# Patient Record
Sex: MALE | Race: WHITE | NOT HISPANIC OR LATINO | URBAN - METROPOLITAN AREA
[De-identification: names, ages, dates, MRNs, and addresses within clinical notes are randomized per-mention and may not be internally consistent; named-entity substitution may affect disease eponyms.]

---

## 2019-12-17 ENCOUNTER — EMERGENCY (EMERGENCY)
Facility: HOSPITAL | Age: 59
LOS: 1 days | Discharge: HOME | End: 2019-12-17
Attending: EMERGENCY MEDICINE
Payer: COMMERCIAL

## 2019-12-17 VITALS
WEIGHT: 179.9 LBS | DIASTOLIC BLOOD PRESSURE: 82 MMHG | TEMPERATURE: 98 F | OXYGEN SATURATION: 96 % | HEART RATE: 61 BPM | HEIGHT: 64 IN | SYSTOLIC BLOOD PRESSURE: 137 MMHG | RESPIRATION RATE: 18 BRPM

## 2019-12-17 PROCEDURE — 99284 EMERGENCY DEPT VISIT MOD MDM: CPT

## 2019-12-17 NOTE — ED ADULT TRIAGE NOTE - CHIEF COMPLAINT QUOTE
cat bite on Saturday. worsening swelling and redness since. no DM history. reports chills. on Amoxcillin. tetanus UTD.

## 2019-12-18 VITALS
SYSTOLIC BLOOD PRESSURE: 128 MMHG | DIASTOLIC BLOOD PRESSURE: 71 MMHG | RESPIRATION RATE: 18 BRPM | TEMPERATURE: 99 F | HEART RATE: 62 BPM | OXYGEN SATURATION: 97 %

## 2019-12-18 LAB
ALBUMIN SERPL ELPH-MCNC: 4.1 G/DL — SIGNIFICANT CHANGE UP (ref 3.3–5)
ALP SERPL-CCNC: 72 U/L — SIGNIFICANT CHANGE UP (ref 40–120)
ALT FLD-CCNC: 17 U/L — SIGNIFICANT CHANGE UP (ref 10–45)
ANION GAP SERPL CALC-SCNC: 13 MMOL/L — SIGNIFICANT CHANGE UP (ref 5–17)
AST SERPL-CCNC: 16 U/L — SIGNIFICANT CHANGE UP (ref 10–40)
BASE EXCESS BLDV CALC-SCNC: 0 MMOL/L — SIGNIFICANT CHANGE UP (ref -2–2)
BILIRUB SERPL-MCNC: 0.2 MG/DL — SIGNIFICANT CHANGE UP (ref 0.2–1.2)
BUN SERPL-MCNC: 17 MG/DL — SIGNIFICANT CHANGE UP (ref 7–23)
CA-I SERPL-SCNC: 1.21 MMOL/L — SIGNIFICANT CHANGE UP (ref 1.12–1.3)
CALCIUM SERPL-MCNC: 9.2 MG/DL — SIGNIFICANT CHANGE UP (ref 8.4–10.5)
CHLORIDE BLDV-SCNC: 107 MMOL/L — SIGNIFICANT CHANGE UP (ref 96–108)
CHLORIDE SERPL-SCNC: 102 MMOL/L — SIGNIFICANT CHANGE UP (ref 96–108)
CO2 BLDV-SCNC: 26 MMOL/L — SIGNIFICANT CHANGE UP (ref 22–30)
CO2 SERPL-SCNC: 21 MMOL/L — LOW (ref 22–31)
CREAT SERPL-MCNC: 0.77 MG/DL — SIGNIFICANT CHANGE UP (ref 0.5–1.3)
GAS PNL BLDV: 138 MMOL/L — SIGNIFICANT CHANGE UP (ref 135–145)
GAS PNL BLDV: SIGNIFICANT CHANGE UP
GAS PNL BLDV: SIGNIFICANT CHANGE UP
GLUCOSE BLDV-MCNC: 155 MG/DL — HIGH (ref 70–99)
GLUCOSE SERPL-MCNC: 155 MG/DL — HIGH (ref 70–99)
HCO3 BLDV-SCNC: 24 MMOL/L — SIGNIFICANT CHANGE UP (ref 21–29)
HCT VFR BLD CALC: 41 % — SIGNIFICANT CHANGE UP (ref 39–50)
HCT VFR BLDA CALC: 43 % — SIGNIFICANT CHANGE UP (ref 39–50)
HGB BLD CALC-MCNC: 14 G/DL — SIGNIFICANT CHANGE UP (ref 13–17)
HGB BLD-MCNC: 13.7 G/DL — SIGNIFICANT CHANGE UP (ref 13–17)
LACTATE BLDV-MCNC: 1 MMOL/L — SIGNIFICANT CHANGE UP (ref 0.7–2)
MCHC RBC-ENTMCNC: 30.4 PG — SIGNIFICANT CHANGE UP (ref 27–34)
MCHC RBC-ENTMCNC: 33.4 GM/DL — SIGNIFICANT CHANGE UP (ref 32–36)
MCV RBC AUTO: 90.9 FL — SIGNIFICANT CHANGE UP (ref 80–100)
NRBC # BLD: 0 /100 WBCS — SIGNIFICANT CHANGE UP (ref 0–0)
OTHER CELLS CSF MANUAL: 18 ML/DL — SIGNIFICANT CHANGE UP (ref 18–22)
PCO2 BLDV: 41 MMHG — SIGNIFICANT CHANGE UP (ref 35–50)
PH BLDV: 7.4 — SIGNIFICANT CHANGE UP (ref 7.35–7.45)
PLATELET # BLD AUTO: 249 K/UL — SIGNIFICANT CHANGE UP (ref 150–400)
PO2 BLDV: 61 MMHG — HIGH (ref 25–45)
POTASSIUM BLDV-SCNC: 3.6 MMOL/L — SIGNIFICANT CHANGE UP (ref 3.5–5.3)
POTASSIUM SERPL-MCNC: 3.7 MMOL/L — SIGNIFICANT CHANGE UP (ref 3.5–5.3)
POTASSIUM SERPL-SCNC: 3.7 MMOL/L — SIGNIFICANT CHANGE UP (ref 3.5–5.3)
PROT SERPL-MCNC: 7.6 G/DL — SIGNIFICANT CHANGE UP (ref 6–8.3)
RBC # BLD: 4.51 M/UL — SIGNIFICANT CHANGE UP (ref 4.2–5.8)
RBC # FLD: 12.5 % — SIGNIFICANT CHANGE UP (ref 10.3–14.5)
SAO2 % BLDV: 91 % — HIGH (ref 67–88)
SODIUM SERPL-SCNC: 136 MMOL/L — SIGNIFICANT CHANGE UP (ref 135–145)
WBC # BLD: 8.55 K/UL — SIGNIFICANT CHANGE UP (ref 3.8–10.5)
WBC # FLD AUTO: 8.55 K/UL — SIGNIFICANT CHANGE UP (ref 3.8–10.5)

## 2019-12-18 PROCEDURE — 80053 COMPREHEN METABOLIC PANEL: CPT

## 2019-12-18 PROCEDURE — 96365 THER/PROPH/DIAG IV INF INIT: CPT

## 2019-12-18 PROCEDURE — 96375 TX/PRO/DX INJ NEW DRUG ADDON: CPT

## 2019-12-18 PROCEDURE — 99284 EMERGENCY DEPT VISIT MOD MDM: CPT | Mod: 25

## 2019-12-18 PROCEDURE — 96376 TX/PRO/DX INJ SAME DRUG ADON: CPT

## 2019-12-18 PROCEDURE — 82330 ASSAY OF CALCIUM: CPT

## 2019-12-18 PROCEDURE — G0378: CPT

## 2019-12-18 PROCEDURE — 99234 HOSP IP/OBS SM DT SF/LOW 45: CPT

## 2019-12-18 PROCEDURE — 87040 BLOOD CULTURE FOR BACTERIA: CPT

## 2019-12-18 PROCEDURE — 84295 ASSAY OF SERUM SODIUM: CPT

## 2019-12-18 PROCEDURE — 82803 BLOOD GASES ANY COMBINATION: CPT

## 2019-12-18 PROCEDURE — 82435 ASSAY OF BLOOD CHLORIDE: CPT

## 2019-12-18 PROCEDURE — 82947 ASSAY GLUCOSE BLOOD QUANT: CPT

## 2019-12-18 PROCEDURE — 85014 HEMATOCRIT: CPT

## 2019-12-18 PROCEDURE — 85027 COMPLETE CBC AUTOMATED: CPT

## 2019-12-18 PROCEDURE — 84132 ASSAY OF SERUM POTASSIUM: CPT

## 2019-12-18 PROCEDURE — 83605 ASSAY OF LACTIC ACID: CPT

## 2019-12-18 RX ORDER — KETOROLAC TROMETHAMINE 30 MG/ML
15 SYRINGE (ML) INJECTION ONCE
Refills: 0 | Status: DISCONTINUED | OUTPATIENT
Start: 2019-12-18 | End: 2019-12-18

## 2019-12-18 RX ORDER — CEFTRIAXONE 500 MG/1
1000 INJECTION, POWDER, FOR SOLUTION INTRAMUSCULAR; INTRAVENOUS ONCE
Refills: 0 | Status: COMPLETED | OUTPATIENT
Start: 2019-12-18 | End: 2019-12-18

## 2019-12-18 RX ORDER — IBUPROFEN 200 MG
600 TABLET ORAL ONCE
Refills: 0 | Status: COMPLETED | OUTPATIENT
Start: 2019-12-18 | End: 2019-12-18

## 2019-12-18 RX ADMIN — Medication 100 MILLIGRAM(S): at 03:11

## 2019-12-18 RX ADMIN — Medication 600 MILLIGRAM(S): at 18:55

## 2019-12-18 RX ADMIN — Medication 100 MILLIGRAM(S): at 18:19

## 2019-12-18 RX ADMIN — Medication 100 MILLIGRAM(S): at 11:08

## 2019-12-18 RX ADMIN — Medication 15 MILLIGRAM(S): at 04:39

## 2019-12-18 RX ADMIN — CEFTRIAXONE 1000 MILLIGRAM(S): 500 INJECTION, POWDER, FOR SOLUTION INTRAMUSCULAR; INTRAVENOUS at 03:11

## 2019-12-18 RX ADMIN — CEFTRIAXONE 100 MILLIGRAM(S): 500 INJECTION, POWDER, FOR SOLUTION INTRAMUSCULAR; INTRAVENOUS at 02:21

## 2019-12-18 NOTE — ED PROVIDER NOTE - PHYSICAL EXAMINATION
GEN: Well Appearing, Nontoxic, NAD  HEENT: NC/AT, MMM  Neck: supple  CV: reg rate  RESP: normal WOB, no distress  ABD: non-distended  EXT/MSK:  diffuse edema of dorsum of hand and L index finger, diffusely tender, limited ROM of MCP, PIP due to combination of pain/swelling  SKIN: significant erythema over dorsum of R hand extending from thumb towards ulnar aspect involving about 3/4 of the dorsum, tiny puncture wound on radial aspect of R index, not over flexor tendon, no focal tenderness over flexor tendon  Neuro: sensory and motor intact

## 2019-12-18 NOTE — ED ADULT NURSE REASSESSMENT NOTE - NS ED NURSE REASSESS COMMENT FT1
Pt received from DARREN Rocha. Pt oriented to CDU & plan of care was discussed. Pt endorses 5/10 pain to R hand. To be medicated as per MAR. Significant swelling noted to R hand. Pt able to close & open fist except for R index finger. Some redness noted. Pt encouraged to elevate hand across chest. Safety & comfort measures maintained. Call bell in reach. Will continue to monitor.
07.00 Am Received Report from DARREN Jacques pt is observed for Right hand cat bite for IV antibiotics Pt is due for IV clindamycin 600 mg @ 11am . Pt is A&OX4 has stable vitals Shantal N/V/D fever chills cp sob  IV site ,looks clean &dry  no signs of infiltration noted  Pt has + swelling in Right hand + bite stacey on 2nd finger Limited ROM  Redness swelling not spreading beyond the marker point safety & comfort measures continued  pt denies pain at this time continue to monitor

## 2019-12-18 NOTE — ED PROVIDER NOTE - CLINICAL SUMMARY MEDICAL DECISION MAKING FREE TEXT BOX
Pt presents with erythema and swelling of R hand consistent with cellulitis from cat bite. pt's sx have worsened despite being on Augmentin. given this worsening, pt would benefit from IV abx and observation stay to ensure it improves. pt is neurovasc intact. consideration for tenosynovitis but at this time seems less likely given no focal tendon sheath tenderness, wound not over tendon, but may benefit from hand eval while in CDU if sx do not improve.

## 2019-12-18 NOTE — ED CDU PROVIDER INITIAL DAY NOTE - MEDICAL DECISION MAKING DETAILS
Cat bite with overlying cellulitis not responding to Augmentin. placed in CDU for IV abx and observation to ensure it improves. expect ok to d/c once cellulitis starts receding as long as pt continues to feel and look well.

## 2019-12-18 NOTE — ED CDU PROVIDER INITIAL DAY NOTE - PHYSICAL EXAMINATION
GENERAL: Pt in NAD, A&O x3.   PSYCH: Affect appropriate.  RESPIRATORY: CTA anterior and posterior b/l, no wheezes, rales, or rhonchi.   CARDIAC: RRR, clear distinct S1, S2, no murmurs, gallops, or rubs.   EXTREMITIES: R hand edematous, crusted bite count radial aspect of R 1st digit with surrounding erythema spreading proximally to the region of the 1st MCP, warm, tenderness of dorsum of R hand and throughout R 1st digit. No active bleeding, no DC, no visible FB.  No streaking. No crepitus. No induration or fluctuance. Color appropriate for race, warm, 2+ radial and ulnar pulses b/l, cap refill < 2 seconds. Limited ROM of the R 1st digit with pain. Normal and equal sensation b/l UE, 5/5 strength b/l UE.

## 2019-12-18 NOTE — ED ADULT NURSE NOTE - OBJECTIVE STATEMENT
58 yo male from home sent by PMD for cat bite to right hand. Pt was bit by his own cat, has redness and swelling to right hand. on amox from PMD, tetanus up to date. Pt denies all other complaints. VS stable.

## 2019-12-18 NOTE — ED CDU PROVIDER INITIAL DAY NOTE - DETAILS
- pan control  - abx  - frequent eval  d/w Dr. Linda - pain control  - abx  - frequent eval  d/w Dr. Linda

## 2019-12-18 NOTE — ED ADULT NURSE REASSESSMENT NOTE - MUSCULOSKELETAL WDL
Full range of motion of upper and lower extremities, Right arm  joint tenderness/swelling.
Full range of motion of upper and lower extremities, no joint tenderness/swelling.

## 2019-12-18 NOTE — ED PROVIDER NOTE - OBJECTIVE STATEMENT
cat bite saturday, erythema and pain yest, went to urgetn care, got augmentin - 3 doses, worsening swelling erythema, limited ROm R hand. chills, malaise, fatigue. no n/v. Pt suffered cat bite to his right index finger on saturday. yesterday he developed erythema and pain at the site of the injury so he went to urgent care, got augmentin for it. He has taken 3 doses (last night, this am, tonight) but despite this he noted worsening swelling and erythema and it has gotten more painful. He has limited range of motion now due to pain and swelling. He endorses chills, malaise, fatigue. no n/v.

## 2019-12-18 NOTE — ED ADULT NURSE REASSESSMENT NOTE - NSIMPLEMENTINTERV_GEN_ALL_ED
Implemented All Universal Safety Interventions:  [place] to call system. Call bell, personal items and telephone within reach. Instruct patient to call for assistance. Room bathroom lighting operational. Non-slip footwear when patient is off stretcher. Physically safe environment: no spills, clutter or unnecessary equipment. Stretcher in lowest position, wheels locked, appropriate side rails in place.

## 2019-12-18 NOTE — ED CDU PROVIDER INITIAL DAY NOTE - PROGRESS NOTE DETAILS
Patient seen and evaluated at bedside. NAD. Reports R hand pain/swelling have slightly improved. No fever/chills. VSS. On exam, pt with healing puncture wound on R second digit without any drainage, slightly decreased flexion of second digit 2/2 swelling and NO pain with extension. + diffuse edema/erythema to dorsum of hand radial aspect>ulna. Sensation intact. Radial pulse 2+. Borders marked this morning. R hand elevated. Will continue to monitor. Pt reports persistent improvement in R hand pain/swelling. No fever/chills. VSS. Erythema within marked borders. Attending note. Patient has a cellulitis of the right index finger secondary to cat bites. Patient has been receiving IV dose of clindamycin with improvement. He reports decreased pain and decreased redness. He denies any fevers. He has greater range of motion of his finger. Physical examination-there is slight erythema and swelling of the right index finger and dorsum of the hand. There is no ascending lymphangitis. There is no tenderness on the flexor surface of the hand or digits. Patient will be discharged with clindamycin. Patient is stable for discharge. Patient will followup with hand as already scheduled. Patient seen and evaluated at bedside. NAD. Reports R hand pain/swelling have slightly improved. No fever/chills. VSS. On exam, pt with healing puncture wound on R second digit without any drainage, slightly decreased flexion of second digit 2/2 swelling and NO pain with extension. NO ttp on flexor surface of R second digit. + diffuse edema/erythema to dorsum of hand radial aspect>ulna. Sensation intact. Radial pulse 2+. Borders marked this morning. R hand elevated. Will continue to monitor. Pt comfortable, NAD. Reports R hand pain/swelling continuing to improve. No fever/chills. VSS. Erythema within marked borders, no streaking. Pt re-eval by myself and Dr. Garcia. Pt has hand appointment scheduled. Prefers to stay for third IV clinda dose, then plan for d/c home on PO abx.

## 2019-12-18 NOTE — ED CDU PROVIDER DISPOSITION NOTE - PATIENT PORTAL LINK FT
You can access the FollowMyHealth Patient Portal offered by [facility] by registering at the following website: [email or web address]. By joining [employer]’s FollowMyHealth portal, you will also be able to view your health information using other applications (apps) compatible with our system.

## 2019-12-18 NOTE — ED CDU PROVIDER DISPOSITION NOTE - NSFOLLOWUPINSTRUCTIONS_ED_ALL_ED_FT
1. Follow up with your PMD within 48-72hours (2-3 days).     2. Take Clindamycin as prescrebid. Finish the full course of your antibiotic, do not skip doses.    3. Rest and elevate affected area. Take Motrin 600mg every 8 hours with food for pain.     4. Return to ED if you experience any worsening redness, swelling, streaking (red lines), fever, chills, or any other concerning symptoms. 1. Follow up with your PMD within 48-72hours (2-3 days).     2. Take your antibiotics as prescribed. Finish the full course of your antibiotic, do not skip doses.    3. Rest and elevate affected area. Take Motrin 600mg every 8 hours with food for pain.     4. Return to ED if you experience any worsening redness, swelling, streaking (red lines), fever, chills, or any other concerning symptoms. 1. Follow up with your PMD within 48-72hours.    2. Take your antibiotics as prescribed:      Clindamycin 300mg four times daily for 10 days.       Finish the full course of your antibiotic, do not skip doses.    3. Rest and elevate affected area. Take Motrin 600mg every 8 hours with food for pain.     4. Return to ED if you experience any worsening redness, swelling, streaking (red lines), fever, chills, pain with hand/finger range of motion, or any other concerning symptoms. 1. Follow up with your PMD within 48-72hours.     Follow up with your appointment with Hand Surgeon.     2. Take your antibiotics as prescribed:      Clindamycin 300mg four times daily for 7 days.       Finish the full course of your antibiotic, do not skip doses.    3. Rest and elevate affected area. Take Motrin 600mg every 8 hours with food for pain.     4. Return to ED if you experience any worsening redness, swelling, streaking (red lines), fever, chills, pain with hand/finger range of motion, or any other concerning symptoms. 1. Follow up with your PCP within 48-72 hours.     Follow up with your appointment with Hand Surgeon.     2. Take your antibiotics as prescribed:      Clindamycin 300mg four times daily for 7 days.     Finish the full course of your antibiotic, do not skip doses.    3. Rest and elevate affected area. Take Motrin 600mg every 8 hours with food for pain as needed.     4. Return to ED if you experience any worsening redness, swelling, streaking (red lines), fever, chills, pain with hand/finger range of motion, or any other concerning symptoms.

## 2019-12-18 NOTE — ED CDU PROVIDER INITIAL DAY NOTE - OBJECTIVE STATEMENT
58 y/o M. no PMHX presents c/o R hand pain/swelling. Pt notes he had a cat bite Saturday on his R hand. Pt owns the cat, states bnite occurred during play, no prior episodes, the cat has not been acting strange. Pt notes erythema and pain began yesterday, went to UC and referred to ED, and Rx augmentin. Pt states he has not improved on the Augmentin (taken a total of 3d doses) fin fact he is feeling worse. Pt also note chills and subjective fever yesterday. Pt deneis recorded fever, n/v/d, CP, SOB, abd pain, long car/plane rides, hormone use.  ED Course: Labs non-actionable, no leukocytosis. Sent to CDU for continued IV abx and frequent eval.  CDU Course: After abx __________ 60 y/o M. no PMHX presents c/o R hand pain/swelling. Pt notes he had a cat bite Saturday on his R hand. Pt owns the cat, states bnite occurred during play, no prior episodes, the cat has not been acting strange. Pt notes erythema and pain began yesterday, went to UC and referred to ED, and Rx augmentin. Pt states he has not improved on the Augmentin (taken a total of 3d doses) fin fact he is feeling worse. Pt also note chills and subjective fever yesterday. Pt deneis recorded fever, n/v/d, CP, SOB, abd pain, long car/plane rides, hormone use.  ED Course: Labs non-actionable, no leukocytosis. Sent to CDU for continued IV abx and frequent eval. 60 y/o M. no PMHX presents c/o R hand pain/swelling. Pt notes he had a cat bite Saturday on his R hand. Pt owns the cat, states bnite occurred during play, no prior episodes, the cat has not been acting strange. Pt notes erythema and pain began yesterday, went to UC and referred to ED, and Rx Augmentin. Pt states he has not improved on the Augmentin (taken a total of 3d doses) fin fact he is feeling worse. Pt also note chills and subjective fever yesterday. Pt denies recorded fever, n/v/d, CP, SOB, abd pain, long car/plane rides, hormone use.  ED Course: Labs non-actionable, no leukocytosis. Sent to CDU for continued IV abx and frequent eval.

## 2019-12-18 NOTE — ED CDU PROVIDER DISPOSITION NOTE - CLINICAL COURSE
58 y/o M. no PMHX presents c/o R hand pain/swelling. Pt notes he had a cat bite Saturday on his R hand. Pt owns the cat, states bnite occurred during play, no prior episodes, the cat has not been acting strange. Pt notes erythema and pain began yesterday, went to UC and referred to ED, and Rx augmentin. Pt states he has not improved on the Augmentin (taken a total of 3d doses) fin fact he is feeling worse. Pt also note chills and subjective fever yesterday. Pt denies recorded fever, n/v/d, CP, SOB, abd pain, long car/plane rides, hormone use.  ED Course: Labs non-actionable, no leukocytosis. Sent to CDU for continued IV abx and frequent eval.  CDU Course: On IV vancomycin the area_____. 60 y/o M. no PMHX presents c/o R hand pain/swelling. Pt notes he had a cat bite Saturday on his R hand. Pt owns the cat, states bnite occurred during play, no prior episodes, the cat has not been acting strange. Pt notes erythema and pain began yesterday, went to UC and referred to ED, and Rx augmentin. Pt states he has not improved on the Augmentin (taken a total of 3d doses) fin fact he is feeling worse. Pt also note chills and subjective fever yesterday. Pt denies recorded fever, n/v/d, CP, SOB, abd pain, long car/plane rides, hormone use.  ED Course: Labs non-actionable, no leukocytosis. Sent to CDU for continued IV abx and frequent eval.  CDU Course: _____ 60 y/o M. no PMHX presents c/o R hand pain/swelling. Pt notes he had a cat bite Saturday on his R hand. Pt owns the cat, states bnite occurred during play, no prior episodes, the cat has not been acting strange. Pt notes erythema and pain began yesterday, went to UC and referred to ED, and Rx augmentin. Pt states he has not improved on the Augmentin (taken a total of 3d doses) fin fact he is feeling worse. Pt also note chills and subjective fever yesterday. Pt denies recorded fever, n/v/d, CP, SOB, abd pain, long car/plane rides, hormone use.  ED Course: Labs non-actionable, no leukocytosis. Sent to CDU for continued IV abx and frequent eval.  CDU Course: Patient remained afebrile. Patient with improvement of R hand pain/swelling/redness while in CDU. Patient stable for d/c on PO antibiotics with strict return precautions.

## 2019-12-23 LAB
CULTURE RESULTS: SIGNIFICANT CHANGE UP
CULTURE RESULTS: SIGNIFICANT CHANGE UP
SPECIMEN SOURCE: SIGNIFICANT CHANGE UP
SPECIMEN SOURCE: SIGNIFICANT CHANGE UP

## 2020-11-23 PROBLEM — Z78.9 OTHER SPECIFIED HEALTH STATUS: Chronic | Status: ACTIVE | Noted: 2019-12-18

## 2020-11-23 PROBLEM — Z00.00 ENCOUNTER FOR PREVENTIVE HEALTH EXAMINATION: Status: ACTIVE | Noted: 2020-11-23

## 2020-12-05 ENCOUNTER — RESULT REVIEW (OUTPATIENT)
Age: 60
End: 2020-12-05

## 2020-12-05 ENCOUNTER — OUTPATIENT (OUTPATIENT)
Dept: OUTPATIENT SERVICES | Facility: HOSPITAL | Age: 60
LOS: 1 days | End: 2020-12-05
Payer: COMMERCIAL

## 2020-12-05 ENCOUNTER — APPOINTMENT (OUTPATIENT)
Dept: MRI IMAGING | Facility: CLINIC | Age: 60
End: 2020-12-05
Payer: COMMERCIAL

## 2020-12-05 DIAGNOSIS — Z00.8 ENCOUNTER FOR OTHER GENERAL EXAMINATION: ICD-10-CM

## 2020-12-05 PROCEDURE — 72141 MRI NECK SPINE W/O DYE: CPT

## 2020-12-05 PROCEDURE — 72141 MRI NECK SPINE W/O DYE: CPT | Mod: 26

## 2020-12-15 ENCOUNTER — APPOINTMENT (OUTPATIENT)
Dept: NEUROLOGY | Facility: CLINIC | Age: 60
End: 2020-12-15
Payer: COMMERCIAL

## 2020-12-15 VITALS
HEART RATE: 71 BPM | OXYGEN SATURATION: 98 % | HEIGHT: 64 IN | DIASTOLIC BLOOD PRESSURE: 75 MMHG | SYSTOLIC BLOOD PRESSURE: 134 MMHG | WEIGHT: 170 LBS | BODY MASS INDEX: 29.02 KG/M2

## 2020-12-15 VITALS — TEMPERATURE: 97.2 F

## 2020-12-15 DIAGNOSIS — G56.22 LESION OF ULNAR NERVE, LEFT UPPER LIMB: ICD-10-CM

## 2020-12-15 PROCEDURE — 99204 OFFICE O/P NEW MOD 45 MIN: CPT

## 2020-12-15 PROCEDURE — 99072 ADDL SUPL MATRL&STAF TM PHE: CPT

## 2020-12-15 NOTE — HISTORY OF PRESENT ILLNESS
[FreeTextEntry1] : Patient presents for evaluation of neck pain and arm tingling.  He states he has h/o chronic neck pain sporadically.  He states that in October he was working as a  (he works in film industry) and noted numbness and tingling left D5 and medial palm with an odd feeling left arm to forearm.  He states he has no neck pain and certain positions precipitate electric sensation from the elbow to the left D5.  This interrupts his ability to play piano and work as a  on set.  \par \par He had MRI C spine:  multilevel DJD, tight narrowing at C67, moderate at C7T1\par \par He denies significant FH

## 2020-12-15 NOTE — ASSESSMENT
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 I have advised him of this and he will have EMG today forwarded to me. \par \par I told him bracing of left elbow at night is best treatment and with decreased rep use of the left arm.  \par \par f/u PRN

## 2020-12-15 NOTE — PHYSICAL EXAM
[General Appearance - Alert] : alert [General Appearance - In No Acute Distress] : in no acute distress [Person] : oriented to person [Place] : oriented to place [Time] : oriented to time [Short Term Intact] : short term memory intact [Remote Intact] : remote memory intact [Registration Intact] : recent registration memory intact [Concentration Intact] : normal concentrating ability [Visual Intact] : visual attention was ~T not ~L decreased [Naming Objects] : no difficulty naming common objects [Repeating Phrases] : no difficulty repeating a phrase [Writing A Sentence] : no difficulty writing a sentence [Fluency] : fluency intact [Comprehension] : comprehension intact [Reading] : reading intact [Past History] : adequate knowledge of personal past history [Cranial Nerves Optic (II)] : visual acuity intact bilaterally,  visual fields full to confrontation, pupils equal round and reactive to light [Cranial Nerves Oculomotor (III)] : extraocular motion intact [Cranial Nerves Trigeminal (V)] : facial sensation intact symmetrically [Cranial Nerves Facial (VII)] : face symmetrical [Cranial Nerves Vestibulocochlear (VIII)] : hearing was intact bilaterally [Cranial Nerves Glossopharyngeal (IX)] : tongue and palate midline [Cranial Nerves Accessory (XI - Cranial And Spinal)] : head turning and shoulder shrug symmetric [Cranial Nerves Hypoglossal (XII)] : there was no tongue deviation with protrusion [Motor Tone] : muscle tone was normal in all four extremities [Motor Handedness Right-Handed] : the patient is right hand dominant [Sensation Tactile Decrease] : light touch was intact [Sensation Vibration Decrease] : vibration was intact [Proprioception] : proprioception was intact [Abnormal Walk] : normal gait [Balance] : balance was intact [2+] : Ankle jerk left 2+ [Motor Strength] : muscle strength was normal in all four extremities [Past-pointing] : there was no past-pointing [Tremor] : no tremor present [Plantar Reflex Right Only] : normal on the right [Plantar Reflex Left Only] : normal on the left [FreeTextEntry6] : positive tinel left elbow.  atrophy left FDI, weak left [id number]

## 2020-12-15 NOTE — REVIEW OF SYSTEMS
[As Noted in HPI] : as noted in HPI [Negative] : Heme/Lymph [FreeTextEntry2] : night sweats but otherwise no malaise

## 2020-12-21 ENCOUNTER — NON-APPOINTMENT (OUTPATIENT)
Age: 60
End: 2020-12-21

## 2021-02-22 ENCOUNTER — APPOINTMENT (OUTPATIENT)
Dept: OTOLARYNGOLOGY | Facility: CLINIC | Age: 61
End: 2021-02-22

## 2021-08-04 ENCOUNTER — APPOINTMENT (OUTPATIENT)
Dept: OTOLARYNGOLOGY | Facility: CLINIC | Age: 61
End: 2021-08-04
Payer: COMMERCIAL

## 2021-08-04 DIAGNOSIS — H90.5 UNSPECIFIED SENSORINEURAL HEARING LOSS: ICD-10-CM

## 2021-08-04 DIAGNOSIS — H93.13 TINNITUS, BILATERAL: ICD-10-CM

## 2021-08-04 PROCEDURE — 99203 OFFICE O/P NEW LOW 30 MIN: CPT | Mod: 25

## 2021-08-04 PROCEDURE — 92557 COMPREHENSIVE HEARING TEST: CPT

## 2021-08-04 PROCEDURE — 92625 TINNITUS ASSESSMENT: CPT

## 2021-08-04 PROCEDURE — 92567 TYMPANOMETRY: CPT

## 2021-08-04 RX ORDER — GINKGO BILOBA LEAF EXTRACT 40 MG
CAPSULE ORAL
Refills: 0 | Status: ACTIVE | COMMUNITY

## 2021-08-04 RX ORDER — VITAMIN B COMPLEX
CAPSULE ORAL
Refills: 0 | Status: ACTIVE | COMMUNITY

## 2021-08-19 PROBLEM — H90.5 HEARING DISORDER, COCHLEAR: Status: ACTIVE | Noted: 2021-08-19

## 2021-08-19 PROBLEM — H93.13 TINNITUS OF BOTH EARS: Status: ACTIVE | Noted: 2020-12-15

## 2021-08-19 NOTE — HISTORY OF PRESENT ILLNESS
[de-identified] : 60M eval for HL- notes having gradual b/l HL for the past 10 years- most recent audiogram done within the year with audiologist- no copy on hand- was told to pursue HAs- unable to do so- works as - pt notes tinnitus is constant and bothersome - does not prevent pt from sleeping. Pt reports family hx of HL in mother- who is 91 years of age. Pt denies otalgia, otorrhea, ear infections. No fam hx HL - b/l tinnitus - fluctuates - worse with tiredness, feeling flushed, & loud noises. No vertigo .

## 2021-08-19 NOTE — DATA REVIEWED
[de-identified] : Bilateral -mild to moderate sensorineural hearing loss after 2000Hz\par Impedance testing reveals normal Type A tympanograms bilaterally\par Matched tinnitus closest to 9K@50dB [de-identified] : -mild to moderate

## 2021-08-31 ENCOUNTER — APPOINTMENT (OUTPATIENT)
Dept: INTERNAL MEDICINE | Facility: CLINIC | Age: 61
End: 2021-08-31

## 2022-08-02 ENCOUNTER — APPOINTMENT (OUTPATIENT)
Dept: PULMONOLOGY | Facility: CLINIC | Age: 62
End: 2022-08-02

## 2022-09-08 ENCOUNTER — APPOINTMENT (OUTPATIENT)
Dept: DERMATOLOGY | Facility: CLINIC | Age: 62
End: 2022-09-08

## 2022-09-08 VITALS — HEIGHT: 64 IN | BODY MASS INDEX: 30.73 KG/M2 | WEIGHT: 180 LBS

## 2022-09-08 DIAGNOSIS — L30.9 DERMATITIS, UNSPECIFIED: ICD-10-CM

## 2022-09-08 DIAGNOSIS — D22.9 MELANOCYTIC NEVI, UNSPECIFIED: ICD-10-CM

## 2022-09-08 DIAGNOSIS — Z12.83 ENCOUNTER FOR SCREENING FOR MALIGNANT NEOPLASM OF SKIN: ICD-10-CM

## 2022-09-08 PROCEDURE — 99204 OFFICE O/P NEW MOD 45 MIN: CPT

## 2022-09-08 RX ORDER — CLOBETASOL PROPIONATE 0.5 MG/ML
0.05 SOLUTION TOPICAL
Qty: 1 | Refills: 2 | Status: ACTIVE | COMMUNITY
Start: 2022-09-08 | End: 1900-01-01

## 2022-09-13 ENCOUNTER — APPOINTMENT (OUTPATIENT)
Dept: PULMONOLOGY | Facility: CLINIC | Age: 62
End: 2022-09-13

## 2022-09-13 VITALS
BODY MASS INDEX: 32.61 KG/M2 | OXYGEN SATURATION: 93 % | WEIGHT: 191 LBS | DIASTOLIC BLOOD PRESSURE: 75 MMHG | RESPIRATION RATE: 18 BRPM | TEMPERATURE: 97.5 F | HEIGHT: 64 IN | HEART RATE: 64 BPM | SYSTOLIC BLOOD PRESSURE: 122 MMHG

## 2022-09-13 DIAGNOSIS — R06.83 SNORING: ICD-10-CM

## 2022-09-13 PROCEDURE — 99204 OFFICE O/P NEW MOD 45 MIN: CPT

## 2022-09-13 RX ORDER — BRAN 5 G
WAFER ORAL
Refills: 0 | Status: DISCONTINUED | COMMUNITY
End: 2022-09-13

## 2022-09-14 PROBLEM — R06.83 SNORING: Status: ACTIVE | Noted: 2022-09-13

## 2022-09-14 NOTE — END OF VISIT
[FreeTextEntry3] : I, Dr. Thu Stafford personally performed the evaluation and management (E/M) services for this new patient.  That E/M includes conducting the initial examination, assessing all conditions, and establishing the plan of care.  Today, Erica Taylor ACP was here to observe my evaluation and management services for this patient to be followed going forward.\par The patient has signs and symptoms suggestive of sleep disordered breathing. Therapeutic modalities were discussed with the patient and a home sleep study will be scheduled. Follow up upon completion of sleep study.\par \par 45 minutes time spent for patient education related to comorbidities and medications reviews, preventative care, documentation, coordination of care.\par

## 2022-09-14 NOTE — ASSESSMENT
[FreeTextEntry1] : ATTENDING ATTESTATION\par \par 62 year old here for initial sleep consult. Pmhx significant for obesity. Former smoker 7.5pys Here for r/o FREEMAN\par Hx of tonsillectomy as child\par \par The patient has signs and symptoms suggestive of sleep disordered breathing. Therapeutic modalities were discussed with the patient and a HST sleep study will be scheduled. Discussed with patient the rationale for treatment of FREEMAN including improved quality of life, daytime function and to decrease cardiovascular risks that are associated with untreated FREEMAN. The patient verbalized understanding\par - Ordered HST \par - discussed importance of weight loss management\par \par up to date with Prostate exam/Colonoscopy \par -declined flu and pna vaccines today \par \par Follow up upon completion of sleep study.\par

## 2022-09-14 NOTE — PHYSICAL EXAM
[Normal Appearance] : normal appearance [Well Groomed] : well groomed [General Appearance - In No Acute Distress] : no acute distress [Normal Conjunctiva] : the conjunctiva exhibited no abnormalities [I] : I [Neck Appearance] : the appearance of the neck was normal [Heart Rate And Rhythm] : heart rate was normal and rhythm regular [Murmurs] : no murmurs [Respiration, Rhythm And Depth] : normal respiratory rhythm and effort [Exaggerated Use Of Accessory Muscles For Inspiration] : no accessory muscle use [Auscultation Breath Sounds / Voice Sounds] : lungs were clear to auscultation bilaterally [Abnormal Walk] : normal gait [Nail Clubbing] : no clubbing of the fingernails [Cyanosis, Localized] : no localized cyanosis [] : no rash [No Focal Deficits] : no focal deficits [Oriented To Time, Place, And Person] : oriented to person, place, and time [Impaired Insight] : insight and judgment were intact [FreeTextEntry1] : absent tonsils [FreeTextEntry2] : No edema

## 2022-09-14 NOTE — REVIEW OF SYSTEMS
[EDS: ESS=____] : daytime somnolence: ESS=[unfilled] [Fatigue] : fatigue [Recent Wt Gain (___ Lbs)] : recent [unfilled] ~Ulb weight gain [Snoring] : snoring [Postnasal Drip] : postnasal drip [Witnessed Apneas] : witnessed apnea [A.M. Dry Mouth] : a.m. dry mouth [Obesity] : obesity [Heartburn] : heartburn [Nocturia] : nocturia [Negative] : Musculoskeletal [Depression] : depression [Anxious] : anxious [Recent Wt Loss (___ Lbs)] : no recent weight loss [Nasal Congestion] : no nasal congestion [Shortness Of Breath] : no shortness of breath [Orthopnea] : no orthopnea [Thyroid Disease] : no thyroid disease [Diabetes] : no diabetes  [Difficulty Initiating Sleep] : no difficulty falling asleep [Difficulty Maintaining Sleep] : no difficulty maintaining sleep [Acute Insomnia] : no acute insomnia [Chronic Insomnia] : no chronic  insomnia [Lower Extremity Discomfort] : no lower extremity discomfort [Irresistible urge to move legs] : no irresistible urge to move legs because of lower extremity discomfort [LE discomfort relieved by movement] : lower extremity discomfort not relieved by movement [Late day/ Evening symptoms] : no late day/evening symptoms [Sleep Disturbances due to LE symptoms] : ~T no sleep disturbances due to lower extremity symptoms [Unusual Sleep Behavior] : no unusual sleep behavior [Hypersomnolence] : not sleeping much more than usual [Cataplexy] :  no cataplexy [Hypnogogic Hallucinations] : no hypnogogic hallucinations [Hypnopompic Hallucinations] : no hypnopompic hallucinations [Sleep Paralysis] : no sleep paralysis [FreeTextEntry5] : 1-2x a few days a week [de-identified] : ?

## 2022-10-27 ENCOUNTER — APPOINTMENT (OUTPATIENT)
Dept: SLEEP CENTER | Facility: CLINIC | Age: 62
End: 2022-10-27

## 2022-10-27 ENCOUNTER — OUTPATIENT (OUTPATIENT)
Dept: OUTPATIENT SERVICES | Facility: HOSPITAL | Age: 62
LOS: 1 days | End: 2022-10-27
Payer: COMMERCIAL

## 2022-10-27 PROCEDURE — 95806 SLEEP STUDY UNATT&RESP EFFT: CPT

## 2022-10-27 PROCEDURE — 95806 SLEEP STUDY UNATT&RESP EFFT: CPT | Mod: 26

## 2022-11-07 ENCOUNTER — NON-APPOINTMENT (OUTPATIENT)
Age: 62
End: 2022-11-07

## 2022-11-08 ENCOUNTER — NON-APPOINTMENT (OUTPATIENT)
Age: 62
End: 2022-11-08

## 2022-12-01 DIAGNOSIS — G47.33 OBSTRUCTIVE SLEEP APNEA (ADULT) (PEDIATRIC): ICD-10-CM

## 2022-12-29 NOTE — ED CDU PROVIDER INITIAL DAY NOTE - PROGRESS NOTE ADDITIONAL3
Patient seen for site check post ICD implant. Dressing removed. No signs of inflammation or infection noted. Edges well approximated. Patient has no complaints of pain or discomfort. Single steri strip applied. Patient instructed to not lift arm higher than shoulder level.
Additional Progress Note...

## 2023-01-08 NOTE — REASON FOR VISIT
Infusion Nursing Note:  Marlo Vee presents today for IVF & lab draw.    Patient seen by provider today: No   present during visit today: Not Applicable.    Note: N/A.    Intravenous Access:  Peripheral IV placed.    Treatment Conditions:  Not Applicable.    Post Infusion Assessment:  Patient tolerated infusion without incident.  Blood return noted pre and post infusion.  Site patent and intact, free from redness, edema or discomfort.  No evidence of extravasations.  Access discontinued per protocol.     Discharge Plan:   Discharge instructions reviewed with: Patient.  Patient and/or family verbalized understanding of discharge instructions and all questions answered.  Patient discharged in stable condition accompanied by: self.  Departure Mode: Ambulatory.      Ritika Bruce RN                       [Initial Evaluation] : an initial evaluation

## 2023-01-10 ENCOUNTER — APPOINTMENT (OUTPATIENT)
Dept: PULMONOLOGY | Facility: CLINIC | Age: 63
End: 2023-01-10
Payer: COMMERCIAL

## 2023-01-10 VITALS
BODY MASS INDEX: 33.29 KG/M2 | OXYGEN SATURATION: 95 % | HEART RATE: 58 BPM | DIASTOLIC BLOOD PRESSURE: 81 MMHG | SYSTOLIC BLOOD PRESSURE: 142 MMHG | RESPIRATION RATE: 17 BRPM | HEIGHT: 64 IN | WEIGHT: 195 LBS | TEMPERATURE: 97.5 F

## 2023-01-10 VITALS — OXYGEN SATURATION: 97 % | SYSTOLIC BLOOD PRESSURE: 132 MMHG | DIASTOLIC BLOOD PRESSURE: 84 MMHG

## 2023-01-10 DIAGNOSIS — G47.33 OBSTRUCTIVE SLEEP APNEA (ADULT) (PEDIATRIC): ICD-10-CM

## 2023-01-10 PROCEDURE — 99214 OFFICE O/P EST MOD 30 MIN: CPT

## 2023-01-10 NOTE — HISTORY OF PRESENT ILLNESS
[Snoring] : snoring [Witnessed Apneas] : witnessed sleep apnea [Unintentional Sleep while Active] : unintentional sleep while active [Unintentional Sleep While Inactive] : unintentional sleep while inactive [Awakes Unrefreshed] : awakening unrefreshed [Awakening With Dry Mouth] : awakening with dry mouth [Recent  Weight Gain] : recent weight gain [Daytime Somnolence] : daytime somnolence [To Bed: ___] : ~he/she~ goes to bed at [unfilled] [Arises: ___] : arises at [unfilled] [Sleep Onset Latency: ___ minutes] : sleep onset latency of [unfilled] minutes reported [Nocturnal Awakenings: ___] : ~he/she~ typically has [unfilled] nocturnal awakenings [TST: ___] : Total sleep time is [unfilled] [Daytime Sleep: ___] : daytime sleep: [unfilled] [FreeTextEntry1] : 62 year old here for initial sleep consult. Pmhx significant for obesity and GERD. Former smoker 7.5pys. Hx of COVID19 7/2022 mild uri denies residual sxs. Here today for FREEMAN f/u Mild GARRY 10 but severe in Supine position 50 with significant desats also in supine. Pt has no acute complaints reporting benefit. \par Hx of tonsillectomy as child\par Reports feeling well rested with the APAP 5-15, avg AHI 2.3 wears it nightly. Sleeping more continuously with less nighttime awakenings, average getting 7-8 hours a night. Changes in work schedule also allowing him to sleep more. Denies sob, cough, wheezing, fevers. 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

## 2023-01-10 NOTE — PHYSICAL EXAM
[Normal Appearance] : normal appearance [Well Groomed] : well groomed [General Appearance - In No Acute Distress] : no acute distress [Normal Conjunctiva] : the conjunctiva exhibited no abnormalities [I] : I [Heart Rate And Rhythm] : heart rate was normal and rhythm regular [Murmurs] : no murmurs [Respiration, Rhythm And Depth] : normal respiratory rhythm and effort [Exaggerated Use Of Accessory Muscles For Inspiration] : no accessory muscle use [Auscultation Breath Sounds / Voice Sounds] : lungs were clear to auscultation bilaterally [Abnormal Walk] : normal gait [Nail Clubbing] : no clubbing of the fingernails [Cyanosis, Localized] : no localized cyanosis [No Focal Deficits] : no focal deficits [Oriented To Time, Place, And Person] : oriented to person, place, and time [Impaired Insight] : insight and judgment were intact [Neck Appearance] : the appearance of the neck was normal [] : the neck was supple [FreeTextEntry1] : absent tonsils [FreeTextEntry2] : No edema

## 2023-01-10 NOTE — END OF VISIT
[FreeTextEntry3] : I, Dr. Thu Stafford, personally performed the evaluation and management (E/M) services for this established patient who presents today with (a) new problem(s)/exacerbation of (an) existing condition(s).  That E/M includes conducting the examination, assessing all new/exacerbated conditions, and establishing a new plan of care.  Today, Erica Taylor ACP, was here to observe my evaluation and management services for this new problem/exacerbated condition to be followed going forward.\par \par 30 minutes time spent for patient education related to comorbidities and medications, medical records/labs/radiology reviews, preventative care, documentation, coordination of care.\par \par

## 2023-01-10 NOTE — ASSESSMENT
[FreeTextEntry1] : ATTENDING ATTESTATION\par \par 62 year old here for follow up Mild FREEMAN 10 severe in supine on APAP 5-15. Pmhx significant for obesity. Former smoker 7.5pys\par \par FREEMAN \par - cont APAP nightly\par - DME CS, Nasal Pillows mask\par - Requested pt be linked remotely\par - Reviewed compliance data on pt phone, AHI avg 2.3, no significant leak, useage 7-8 hours per night about 83% of the last 30 days. \par - discussed importance of weight loss management\par \par up to date with Prostate exam/Colonoscopy\par -declined flu and pna vaccines\par \par Follow up annually sooner if necessary

## 2023-01-10 NOTE — REVIEW OF SYSTEMS
[EDS: ESS=____] : daytime somnolence: ESS=[unfilled] [Fatigue] : fatigue [Recent Wt Gain (___ Lbs)] : recent [unfilled] ~Ulb weight gain [Snoring] : snoring [Postnasal Drip] : postnasal drip [Witnessed Apneas] : witnessed apnea [A.M. Dry Mouth] : a.m. dry mouth [Obesity] : obesity [Heartburn] : heartburn [Nocturia] : nocturia [Depression] : depression [Anxious] : anxious [Negative] : Musculoskeletal [Recent Wt Loss (___ Lbs)] : no recent weight loss [Nasal Congestion] : no nasal congestion [Shortness Of Breath] : no shortness of breath [Orthopnea] : no orthopnea [Thyroid Disease] : no thyroid disease [Diabetes] : no diabetes  [Difficulty Initiating Sleep] : no difficulty falling asleep [Difficulty Maintaining Sleep] : no difficulty maintaining sleep [Acute Insomnia] : no acute insomnia [Chronic Insomnia] : no chronic  insomnia [Lower Extremity Discomfort] : no lower extremity discomfort [Irresistible urge to move legs] : no irresistible urge to move legs because of lower extremity discomfort [LE discomfort relieved by movement] : lower extremity discomfort not relieved by movement [Late day/ Evening symptoms] : no late day/evening symptoms [Sleep Disturbances due to LE symptoms] : ~T no sleep disturbances due to lower extremity symptoms [Unusual Sleep Behavior] : no unusual sleep behavior [Hypersomnolence] : not sleeping much more than usual [Cataplexy] :  no cataplexy [Hypnogogic Hallucinations] : no hypnogogic hallucinations [Hypnopompic Hallucinations] : no hypnopompic hallucinations [Sleep Paralysis] : no sleep paralysis [FreeTextEntry5] : 1-2x a few days a week [de-identified] : ?

## 2023-03-07 NOTE — ED CDU PROVIDER INITIAL DAY NOTE - NS ED MD PROGRESS NOTE PROVIDER NAME4 FT
normal appearance , without tenderness upon palpation , no deformities , trachea midline , Thyroid normal size , no masses
- Ebonie Barbosa PA-C

## 2025-02-06 ENCOUNTER — TRANSCRIPTION ENCOUNTER (OUTPATIENT)
Age: 65
End: 2025-02-06

## 2025-02-06 ENCOUNTER — APPOINTMENT (OUTPATIENT)
Dept: ORTHOPEDIC SURGERY | Facility: CLINIC | Age: 65
End: 2025-02-06
Payer: COMMERCIAL

## 2025-02-06 VITALS — WEIGHT: 174 LBS | BODY MASS INDEX: 29.71 KG/M2 | HEIGHT: 64 IN

## 2025-02-06 DIAGNOSIS — M18.12 UNILATERAL PRIMARY OSTEOARTHRITIS OF FIRST CARPOMETACARPAL JOINT, LEFT HAND: ICD-10-CM

## 2025-02-06 DIAGNOSIS — M18.11 UNILATERAL PRIMARY OSTEOARTHRITIS OF FIRST CARPOMETACARPAL JOINT, RIGHT HAND: ICD-10-CM

## 2025-02-06 DIAGNOSIS — Z87.891 PERSONAL HISTORY OF NICOTINE DEPENDENCE: ICD-10-CM

## 2025-02-06 DIAGNOSIS — M67.431 GANGLION, RIGHT WRIST: ICD-10-CM

## 2025-02-06 DIAGNOSIS — Z78.9 OTHER SPECIFIED HEALTH STATUS: ICD-10-CM

## 2025-02-06 PROCEDURE — 99204 OFFICE O/P NEW MOD 45 MIN: CPT
